# Patient Record
Sex: FEMALE | Race: WHITE | Employment: UNEMPLOYED | URBAN - METROPOLITAN AREA
[De-identification: names, ages, dates, MRNs, and addresses within clinical notes are randomized per-mention and may not be internally consistent; named-entity substitution may affect disease eponyms.]

---

## 2022-02-15 ENCOUNTER — TELEPHONE (OUTPATIENT)
Dept: DERMATOLOGY | Age: 22
End: 2022-02-15

## 2022-02-15 NOTE — TELEPHONE ENCOUNTER
LVMM x1 for new patient referral for a nevus.  I think the patient lives in a group home so I left a vm on the answering machine

## 2022-08-23 ENCOUNTER — HOSPITAL ENCOUNTER (OUTPATIENT)
Age: 22
Setting detail: SPECIMEN
Discharge: HOME | End: 2022-08-23

## 2022-08-23 ENCOUNTER — OFFICE VISIT (OUTPATIENT)
Dept: DERMATOLOGY | Age: 22
End: 2022-08-23
Payer: COMMERCIAL

## 2022-08-23 VITALS
WEIGHT: 162.2 LBS | OXYGEN SATURATION: 99 % | SYSTOLIC BLOOD PRESSURE: 119 MMHG | TEMPERATURE: 97.5 F | HEART RATE: 75 BPM | DIASTOLIC BLOOD PRESSURE: 77 MMHG

## 2022-08-23 DIAGNOSIS — L81.4 LENTIGINES: ICD-10-CM

## 2022-08-23 DIAGNOSIS — D22.9 IRRITATED NEVUS: Primary | ICD-10-CM

## 2022-08-23 PROCEDURE — 11300 SHAVE SKIN LESION 0.5 CM/<: CPT | Performed by: PHYSICIAN ASSISTANT

## 2022-08-23 PROCEDURE — 99202 OFFICE O/P NEW SF 15 MIN: CPT | Performed by: PHYSICIAN ASSISTANT

## 2022-08-23 PROCEDURE — G8428 CUR MEDS NOT DOCUMENT: HCPCS | Performed by: PHYSICIAN ASSISTANT

## 2022-08-23 PROCEDURE — G8421 BMI NOT CALCULATED: HCPCS | Performed by: PHYSICIAN ASSISTANT

## 2022-08-23 PROCEDURE — 4004F PT TOBACCO SCREEN RCVD TLK: CPT | Performed by: PHYSICIAN ASSISTANT

## 2022-08-23 RX ORDER — LIDOCAINE HYDROCHLORIDE 10 MG/ML
5 INJECTION, SOLUTION INFILTRATION; PERINEURAL ONCE
Status: SHIPPED | OUTPATIENT
Start: 2022-08-23

## 2022-08-23 NOTE — PROGRESS NOTES
Dermatology Patient Note  [address]  [place] [id number]  Dept: [phone]  Dept Fax: [phone]      VISITDATE: 8/23/2022   REFERRING PROVIDER: No ref. provider found      Mortimer Howell Day is a 25 y.o. female  who presents today in the office for:    New Patient and Mole (Pt states she has a mole on her abd that has gotten bigger over the yrs  )      HISTORY OF PRESENT ILLNESS:  As above. Lesion is irritated by friction from clothing. Also concerned with pigmented lesions on lower lip. MEDICAL PROBLEMS:  There are no problems to display for this patient. CURRENT MEDICATIONS:   No current outpatient medications on file. Current Facility-Administered Medications   Medication Dose Route Frequency Provider Last Rate Last Admin    lidocaine 1 % injection 5 mg  5 mg IntraDERmal Once Daren Faith PA-C           ALLERGIES:   Not on File    SOCIAL HISTORY:  Social History     Tobacco Use    Smoking status: Not on file    Smokeless tobacco: Not on file   Substance Use Topics    Alcohol use: Not on file       Pertinent ROS:  Review of Systems  Skin: Denies any new changing, growing or bleeding lesions or rashes except as described in the HPI   Constitutional: Denies fevers, chills, and malaise. PHYSICAL EXAM:   /77   Pulse 75   Temp 97.5 °F (36.4 °C) (Temporal)   Wt 162 lb 3.2 oz (73.6 kg)   SpO2 99%     The patient is generally well appearing, well nourished, alert and conversational. Affect is normal.    Cutaneous Exam:  Physical Exam  Focused exam of Left flank and lower lip was performed    Facial covering was removed during examination. Diagnoses/exam findings/medical history pertinent to this visit are listed below:    Assessment:   Diagnosis Orders   1. Irritated nevus  lidocaine 1 % injection 5 mg    Surgical Pathology    SC SHAV SKIN LES <5MM TRUNK,ARM,LEG      2.  Lentigines Plan:  1. Irritated nevus  Shave Removal: The procedure and its risks were explained including but not limited to pain, bleeding, infection, permanent scar, permanent pigment alteration and need for an additional procedure. Consent to proceed with the procedure was obtained from the patient or the parent. After cleaning with alcohol the 4 mm lesion on the Left flank was anesthetized with 1% lidocaine with epinephrine and was removed with a dermablade. Hemostasis was achieved with aluminum chloride and Vaseline and a bandage were applied.   - lidocaine 1 % injection 5 mg  - Surgical Pathology; Future  - OK SHAV SKIN LES <5MM TRUNK,ARM,LEG    2. Lentigines (lower lip)  - reassurance and education       RTC prn    No future appointments. There are no Patient Instructions on file for this visit.       Electronically signed by Julia Murphy PA-C on 8/23/22 at 5:01 PM EDT

## 2022-08-26 LAB — DERMATOLOGY PATHOLOGY REPORT: NORMAL

## 2022-08-26 NOTE — RESULT ENCOUNTER NOTE
We have received and reviewed your biopsy results, which demonstrated a benign skin lesion called a  irritated nevus. No further Tx is required for this lesion.

## 2023-08-16 ENCOUNTER — NURSE ONLY (OUTPATIENT)
Dept: OBGYN | Age: 23
End: 2023-08-16
Payer: COMMERCIAL

## 2023-08-16 DIAGNOSIS — N92.6 MISSED PERIOD: Primary | ICD-10-CM

## 2023-08-16 LAB
CONTROL: PRESENT
PREGNANCY TEST URINE, POC: POSITIVE

## 2023-08-16 PROCEDURE — 81025 URINE PREGNANCY TEST: CPT

## 2023-09-07 ENCOUNTER — SCHEDULED TELEPHONE ENCOUNTER (OUTPATIENT)
Dept: OBGYN | Age: 23
End: 2023-09-07

## 2023-09-07 VITALS — BODY MASS INDEX: 26.18 KG/M2 | HEIGHT: 66 IN

## 2023-09-07 DIAGNOSIS — Z28.21 INFLUENZA VACCINATION DECLINED: ICD-10-CM

## 2023-09-07 DIAGNOSIS — Z23 NEED FOR TDAP VACCINATION: ICD-10-CM

## 2023-09-07 DIAGNOSIS — F12.90 MARIJUANA USE: ICD-10-CM

## 2023-09-07 DIAGNOSIS — Z28.21 COVID-19 VACCINATION DECLINED: ICD-10-CM

## 2023-09-07 DIAGNOSIS — Z34.01 PRIMIGRAVIDA IN FIRST TRIMESTER: ICD-10-CM

## 2023-09-07 DIAGNOSIS — Z83.3 FAMILY HISTORY OF GESTATIONAL DIABETES MELLITUS (GDM) IN MOTHER: ICD-10-CM

## 2023-09-07 DIAGNOSIS — Z72.0 VAPES NICOTINE CONTAINING SUBSTANCE: ICD-10-CM

## 2023-09-07 PROBLEM — D22.5: Status: ACTIVE | Noted: 2022-09-08

## 2023-09-07 PROBLEM — F90.9 ATTENTION DEFICIT DISORDER WITH HYPERACTIVITY: Status: ACTIVE | Noted: 2019-07-16

## 2023-09-07 RX ORDER — PNV NO.95/FERROUS FUM/FOLIC AC 28MG-0.8MG
1 TABLET ORAL DAILY
Qty: 30 TABLET | Refills: 12 | Status: SHIPPED | OUTPATIENT
Start: 2023-09-07

## 2023-09-07 RX ORDER — ASPIRIN 81 MG/1
81 TABLET, CHEWABLE ORAL DAILY
Qty: 30 TABLET | Refills: 5 | Status: SHIPPED | OUTPATIENT
Start: 2023-09-07

## 2023-09-07 NOTE — PROGRESS NOTES
Documentation:  I communicated with the patient and/or health care decision maker about the OB intake. Details of this discussion including any medical advice provided: see notes    Total Time: minutes: 21-30 minutes    Tracy Borjas was evaluated through a synchronous (real-time) audio encounter. Patient identification was verified at the start of the visit. She (or guardian if applicable) is aware that this is a billable service, which includes applicable co-pays. This visit was conducted with the patient's (and/or legal guardian's) verbal consent. She has not had a related appointment within my department in the past 7 days or scheduled within the next 24 hours. The patient was located at Home: [address] [place] [address]. The provider was located at [facility] (Appt Dept): [address] [facility],  [address] [place] [address] Drive. Note: not billable if this call serves to triage the patient into an appointment for the relevant concern    Renata Borjas is a 26 y.o. female evaluated via telephone on 9/7/2023 for Other (OB intake)  . Ayden Osorio RN    First Trimester Plans/Education completed per ACOG Guidelines. Pt counseled and verbalizes understanding. Routine Prenatal Tests,  Risk Factors Identified By Prenatal History, Anticipated Course of Prenatal Care, Nutrition and Weight Gain Counseling, Toxoplasmosis Precautions ( cats/raw meat), Sexual Activity, Exercise, Influenza/Tdap Vaccine, Smoking Counseling, Environmental/Work Hazards, Travel, Alcohol, Illicit/Recreational Drugs, Use Of Any Medications, Indications for Ultrasound, Domestic Violence, Seat Belt Use, Dental Care , Childbirth Classes/Hospital Facilities. Risk factors for current pregnancy: [place]; ADHD; vapes; marijuana use; fetal alcohol exposure; fetal drug exposure;  Fhx GDM    Patient occupation: Employed   Patient lives with: self, alone  Primary Care Provider: Patient is trying to find PCP  Recent ER visits:

## 2023-09-08 ENCOUNTER — FOLLOWUP TELEPHONE ENCOUNTER (OUTPATIENT)
Dept: OBGYN | Age: 23
End: 2023-09-08

## 2023-09-08 ASSESSMENT — PATIENT HEALTH QUESTIONNAIRE - PHQ9
SUM OF ALL RESPONSES TO PHQ QUESTIONS 1-9: 0
SUM OF ALL RESPONSES TO PHQ QUESTIONS 1-9: 0
SUM OF ALL RESPONSES TO PHQ9 QUESTIONS 1 & 2: 0
SUM OF ALL RESPONSES TO PHQ QUESTIONS 1-9: 0
1. LITTLE INTEREST OR PLEASURE IN DOING THINGS: 0
SUM OF ALL RESPONSES TO PHQ QUESTIONS 1-9: 0
2. FEELING DOWN, DEPRESSED OR HOPELESS: 0

## 2023-09-08 NOTE — TELEPHONE ENCOUNTER
SW spoke with Pt for depression screen and [facility] initial assessment. Pt reported having a great support system, needing some help with insurance information, baby items. Reports cessation of tobacco, alcohol and thc. SW completed lead screen with Pt. No risks detected     Pt scored 0 on PHQ-2. SW educated Pt on safe sleep, infant mortality, smoking cessation, CODI Mandate. No issues or concerns with MH symptoms, no depression or anxiety. No issues to discuss with SW at this time. Pt is [facility] with [facility], will contact [facility]. Pt agreed to [facility] referral. SW will follow up at 13 and 28 weeks and as needed. Lead screening for pregnant and breast-feeding women. Do you live in or regularly visit a home built before 1978 that has had renovations, repair work, or remodeling in the past 12 months? No  Have you resided in or emigrated from areas were  contamination is high ([place], [place], [place])? No  Do you live near a manufacturing plant where lead is used e.g. battery manufacturing or recycling, ship building, or plastic manufacturing? No  Do you work with lead or live with someone who does? No  Do you cook with, store or serve food in [address] [place] [address]? No  Do you eat none food substances that may be contaminated with lead such as soil or lead glazed ceramic pottery? No  Do you use alternative therapies, herbs or home remedies imported from [place], [place], [place], [place] or  countries? No  Do you use imported traditional cosmetics such as katya or surma that may be contaminated with lead? No  Do you or a family member engage in hobbies where lead is used e.g. stained glass or making pottery with lead glaze? No  Has your home been identified as having lead pipes or water source lines with lead? No  Have you ever been told that you have high levels of lead in your body?  No  Do you live with someone identified as having elevated lead levels, child, close friend or

## 2023-09-13 ENCOUNTER — TELEPHONE (OUTPATIENT)
Dept: OBGYN | Age: 23
End: 2023-09-13

## 2023-09-13 ENCOUNTER — HOSPITAL ENCOUNTER (OUTPATIENT)
Age: 23
Setting detail: SPECIMEN
Discharge: HOME | End: 2023-09-13

## 2023-09-13 DIAGNOSIS — Z83.3 FAMILY HISTORY OF GESTATIONAL DIABETES MELLITUS (GDM) IN MOTHER: ICD-10-CM

## 2023-09-13 DIAGNOSIS — Z34.01 PRIMIGRAVIDA IN FIRST TRIMESTER: ICD-10-CM

## 2023-09-13 LAB
ABO + RH BLD: NORMAL
BACTERIA URNS QL MICRO: ABNORMAL
BLOOD GROUP ANTIBODIES SERPL: NEGATIVE
CASTS #/AREA URNS LPF: ABNORMAL /LPF (ref 0–8)
EPI CELLS #/AREA URNS HPF: ABNORMAL /HPF (ref 0–5)
GLUCOSE 1H P 50 G GLC PO SERPL-MCNC: 69 MG/DL (ref 70–135)
GLUCOSE ADMINISTRATION: ABNORMAL
HIV 1+2 AB+HIV1 P24 AG SERPL QL IA: NONREACTIVE
RBC #/AREA URNS HPF: ABNORMAL /HPF (ref 0–4)
WBC #/AREA URNS HPF: ABNORMAL /HPF (ref 0–5)

## 2023-09-14 LAB
BASOPHILS # BLD: 0.04 K/UL (ref 0–0.2)
BASOPHILS NFR BLD: 0 % (ref 0–2)
EOSINOPHIL # BLD: 0.12 K/UL (ref 0–0.44)
EOSINOPHILS RELATIVE PERCENT: 1 % (ref 1–4)
ERYTHROCYTE [DISTWIDTH] IN BLOOD BY AUTOMATED COUNT: 13 % (ref 11.8–14.4)
HBV SURFACE AG SERPL QL IA: NONREACTIVE
HCT VFR BLD AUTO: 39.4 % (ref 36.3–47.1)
HGB BLD-MCNC: 12.9 G/DL (ref 11.9–15.1)
IMM GRANULOCYTES # BLD AUTO: 0.04 K/UL (ref 0–0.3)
IMM GRANULOCYTES NFR BLD: 0 %
LYMPHOCYTES NFR BLD: 1.99 K/UL (ref 1.1–3.7)
LYMPHOCYTES RELATIVE PERCENT: 21 % (ref 25–45)
MCH RBC QN AUTO: 28 PG (ref 25.2–33.5)
MCHC RBC AUTO-ENTMCNC: 32.7 G/DL (ref 28.4–34.8)
MCV RBC AUTO: 85.7 FL (ref 82.6–102.9)
MICROORGANISM SPEC CULT: NORMAL
MONOCYTES NFR BLD: 0.48 K/UL (ref 0.1–1.4)
MONOCYTES NFR BLD: 5 % (ref 2–8)
NEUTROPHILS NFR BLD: 73 % (ref 34–64)
NEUTS SEG NFR BLD: 6.97 K/UL (ref 1.5–8.1)
NRBC BLD-RTO: 0 PER 100 WBC
PLATELET # BLD AUTO: 319 K/UL (ref 138–453)
PMV BLD AUTO: 9.4 FL (ref 8.1–13.5)
RBC # BLD AUTO: 4.6 M/UL (ref 3.95–5.11)
RUBV IGG SERPL QL IA: 245.1 IU/ML
SPECIMEN DESCRIPTION: NORMAL
T PALLIDUM AB SER QL IA: NONREACTIVE
WBC OTHER # BLD: 9.6 K/UL (ref 4.5–13.5)

## 2023-09-14 NOTE — RESULT ENCOUNTER NOTE
Normal result. Results can be discussed at next appointment. No further action. Continue routine care.

## 2023-09-16 LAB
AMPHET UR QL SCN: NEGATIVE
BARBITURATES UR QL SCN: NEGATIVE
BENZODIAZ UR QL: NEGATIVE
CANNABINOIDS UR QL SCN: POSITIVE
COCAINE UR QL SCN: NEGATIVE
FENTANYL UR QL: NEGATIVE
HCV AB SERPL QL IA: NONREACTIVE
METHADONE UR QL: NEGATIVE
OPIATES UR QL SCN: NEGATIVE
OXYCODONE UR QL SCN: NEGATIVE
PCP UR QL SCN: NEGATIVE
TEST INFORMATION: ABNORMAL

## 2023-09-25 ENCOUNTER — TELEPHONE (OUTPATIENT)
Dept: OBGYN | Age: 23
End: 2023-09-25

## 2023-09-25 PROBLEM — F12.10 TETRAHYDROCANNABINOL (THC) USE DISORDER, MILD, ABUSE: Status: ACTIVE | Noted: 2023-09-25

## 2023-10-04 ENCOUNTER — HOSPITAL ENCOUNTER (OUTPATIENT)
Age: 23
Setting detail: SPECIMEN
Discharge: HOME | End: 2023-10-04

## 2023-10-04 ENCOUNTER — INITIAL PRENATAL (OUTPATIENT)
Dept: OBGYN | Age: 23
End: 2023-10-04
Payer: COMMERCIAL

## 2023-10-04 VITALS
WEIGHT: 152 LBS | BODY MASS INDEX: 24.53 KG/M2 | HEART RATE: 95 BPM | SYSTOLIC BLOOD PRESSURE: 117 MMHG | DIASTOLIC BLOOD PRESSURE: 71 MMHG

## 2023-10-04 DIAGNOSIS — Z3A.13 13 WEEKS GESTATION OF PREGNANCY: ICD-10-CM

## 2023-10-04 DIAGNOSIS — Z83.3 FAMILY HISTORY OF GESTATIONAL DIABETES MELLITUS (GDM) IN MOTHER: ICD-10-CM

## 2023-10-04 DIAGNOSIS — Z34.01 PRIMIGRAVIDA IN FIRST TRIMESTER: ICD-10-CM

## 2023-10-04 DIAGNOSIS — O09.92 HIGH-RISK PREGNANCY IN SECOND TRIMESTER: Primary | ICD-10-CM

## 2023-10-04 PROCEDURE — 99213 OFFICE O/P EST LOW 20 MIN: CPT

## 2023-10-05 DIAGNOSIS — O09.92 HIGH-RISK PREGNANCY IN SECOND TRIMESTER: ICD-10-CM

## 2023-10-05 LAB
C TRACH DNA SPEC QL PROBE+SIG AMP: NEGATIVE
CANDIDA SPECIES: NEGATIVE
GARDNERELLA VAGINALIS: NEGATIVE
N GONORRHOEA DNA SPEC QL PROBE+SIG AMP: NEGATIVE
SOURCE: NORMAL
SPECIMEN DESCRIPTION: NORMAL
TRICHOMONAS: NEGATIVE

## 2023-10-05 NOTE — PROGRESS NOTES
Attending Physician Statement  I have discussed the care of Tracy Borjas, including pertinent history and exam findings,  with the resident. I have reviewed the key elements of all parts of the encounter with the resident. I agree with the assessment, plan and orders as documented by the resident.   (GE Modifier)    Maryjean Lesches, DO
Hypertension Father     Crohn's Disease Father     Diabetes Mother         Gestational    Hypertension Mother     No Known Problems Brother     No Known Problems Half-Brother        Vitals:  Vitals:    10/04/23 1357   BP: 117/71   Site: Right Upper Arm   Position: Sitting   Cuff Size: Medium Adult   Pulse: 95   Weight: 152 lb (68.9 kg)       BP: 117/71  Weight - Scale: 152 lb (68.9 kg)  Pulse: 95  Patient Position: Sitting  Albumin: Trace  Glucose: Negative  Fetal HR: 135     Physical Exam: Completed, See Epic Navigator   Chaperone for Intimate Exam: Chaperone was presents for entire exam, Chaperone Name: Stacia MANN     [id number]:  Jovanna Borjas is a 26 y.o. female  at 13w3d Initial Obstetrical Visit   - The patient was seen full history and physical was completed/reviewed. - Prenatal labs ordered and completed   - Prenatal vitamins prescription Given   - Aspirin indication: indicated due to High risk factors: none, Moderate risk factors: nulliparity- Rx given   - Problem list reviewed and updated   - First trimester screening and MSAFP Single Marker/NIPT: requested, NIPT sent   - Role of ultrasound in pregnancy discussed; MFM referral sent and first appointment scheduled on 10/9/23   - Gc/Chlam Cultures & Vaginitis: collected today    - Last pap smear: collected today   - Tdap vaccination: discussed recommendations for TDAP immunization, patient requested at appropriate gestational age.    - Influenza vaccination: decliend   - Rhogam: not indicated   - COVID-19 vaccination: R/B/A discussed with increased risk of both maternal and fetal morbidity and mortality in unvaccinated pregnant patients who contract COVID-19- patient declined today   - Indications for  section: none    Fhx GDM   - In patient's mother   - Early 1 hr GTT: 71   - Continue to monitor    ADHD   - Previously on Adderall, stopped in September   - Fetal Exposure to Adderall   - MFM consulted and U/S scheduled    Nevus of Abdominal

## 2023-10-13 LAB — CYTOLOGY REPORT: NORMAL

## 2023-10-17 LAB
HPV I/H RISK 4 DNA CVX QL NAA+PROBE: NOT DETECTED
HPV SAMPLE: NORMAL
HPV, INTERPRETATION: NORMAL
HPV16 DNA CVX QL NAA+PROBE: NOT DETECTED
HPV18 DNA CVX QL NAA+PROBE: NOT DETECTED
SPECIMEN DESCRIPTION: NORMAL

## 2023-10-18 PROBLEM — R87.610 ATYPICAL SQUAMOUS CELLS OF UNDETERMINED SIGNIFICANCE ON CYTOLOGIC SMEAR OF CERVIX (ASC-US): Status: ACTIVE | Noted: 2023-10-18
